# Patient Record
Sex: MALE | Race: WHITE | URBAN - METROPOLITAN AREA
[De-identification: names, ages, dates, MRNs, and addresses within clinical notes are randomized per-mention and may not be internally consistent; named-entity substitution may affect disease eponyms.]

---

## 2021-08-24 ENCOUNTER — WEB ENCOUNTER (OUTPATIENT)
Dept: URBAN - METROPOLITAN AREA CLINIC 80 | Facility: CLINIC | Age: 76
End: 2021-08-24

## 2021-08-24 ENCOUNTER — LAB OUTSIDE AN ENCOUNTER (OUTPATIENT)
Dept: URBAN - METROPOLITAN AREA CLINIC 80 | Facility: CLINIC | Age: 76
End: 2021-08-24

## 2021-08-24 ENCOUNTER — OFFICE VISIT (OUTPATIENT)
Dept: URBAN - METROPOLITAN AREA CLINIC 80 | Facility: CLINIC | Age: 76
End: 2021-08-24
Payer: MEDICARE

## 2021-08-24 DIAGNOSIS — R93.2 ABNORMAL FINDING ON IMAGING OF LIVER: ICD-10-CM

## 2021-08-24 PROBLEM — 428283002: Status: ACTIVE | Noted: 2021-08-24

## 2021-08-24 PROCEDURE — 99204 OFFICE O/P NEW MOD 45 MIN: CPT | Performed by: NURSE PRACTITIONER

## 2021-08-24 NOTE — HPI-TODAY'S VISIT:
The patient was referred by Dr. Saloni Wadsworth for inflammed liver .   A copy of this document is being forwarded to the referring provider. Patient of psoriasis.  He does take Biologics.  This year during his evaluation he was found to have latent TB.  He did complete treatment for this.  However chest x-ray showed lung nodule which led to a PET CT.  He did have lung surgery and the biopsies were benign.  The PET/CT showed inflammation or increased metabolism in his thyroid and his liver.  He had negative biopsies of his thyroid and now he is here for evaluation of his liver.  He denies abdominal pain.  He was never a heavy alcohol drinker.  He does have a history of colon polyps and his last colonoscopy per patient was 11 years ago.  No change in bowel habits.  No overt GI bleeding.  He has intentionally lost about 6 pounds in the past few months.

## 2021-08-25 LAB
A/G RATIO: 1.6
AFP, SERUM, TUMOR MARKER: 3
ALBUMIN: 4.5
ALKALINE PHOSPHATASE: 71
ALT (SGPT): 41
AST (SGOT): 30
BASO (ABSOLUTE): 0.1
BASOS: 1
BILIRUBIN, TOTAL: 0.5
BUN/CREATININE RATIO: 17
BUN: 13
CALCIUM: 9.9
CARBON DIOXIDE, TOTAL: 22
CHLORIDE: 97
CREATININE: 0.78
EGFR IF AFRICN AM: 103
EGFR IF NONAFRICN AM: 89
EOS (ABSOLUTE): 0.1
EOS: 2
GLOBULIN, TOTAL: 2.9
GLUCOSE: 85
HBSAG SCREEN: NEGATIVE
HEMATOCRIT: 45.7
HEMATOLOGY COMMENTS:: (no result)
HEMOGLOBIN: 14.9
HEP A AB, IGM: POSITIVE
HEP B CORE AB, IGM: NEGATIVE
HEP C VIRUS AB: <0.1
IMMATURE CELLS: (no result)
IMMATURE GRANS (ABS): 0
IMMATURE GRANULOCYTES: 0
INR: 1.1
LYMPHS (ABSOLUTE): 2.4
LYMPHS: 32
MCH: 30.3
MCHC: 32.6
MCV: 93
MONOCYTES(ABSOLUTE): 0.9
MONOCYTES: 12
NEUTROPHILS (ABSOLUTE): 4.1
NEUTROPHILS: 53
NRBC: (no result)
PLATELETS: 269
POTASSIUM: 4.6
PROTEIN, TOTAL: 7.4
PROTHROMBIN TIME: 11.2
RBC: 4.91
RDW: 12.4
SODIUM: 133
WBC: 7.5

## 2021-09-01 ENCOUNTER — DASHBOARD ENCOUNTERS (OUTPATIENT)
Age: 76
End: 2021-09-01

## 2021-09-14 ENCOUNTER — OFFICE VISIT (OUTPATIENT)
Dept: URBAN - METROPOLITAN AREA CLINIC 80 | Facility: CLINIC | Age: 76
End: 2021-09-14
Payer: MEDICARE

## 2021-09-14 DIAGNOSIS — R93.2 ABNORMAL FINDING ON IMAGING OF LIVER: ICD-10-CM

## 2021-09-14 PROCEDURE — 99213 OFFICE O/P EST LOW 20 MIN: CPT | Performed by: NURSE PRACTITIONER

## 2021-09-14 NOTE — HPI-TODAY'S VISIT:
75-year-old male seen  today in follow-up after PET scan showed abnormal liver.  We checked labs which were normal and MRI which was normal liver. he did have gallstones but is asymptomatic.  He also had area of plaque versus ulceration in his abdominal aorta was discussed with patient that he will see his cardiologist and primary care.  He does not yet have a vascular surgeon.  His last colonoscopy was 11 years ago.  We discussed colonoscopy but he wants to talk with his primary care.  He denies change in bowel habits or blood in the stool.  has  personal history of colon polyps, no family history CRC.